# Patient Record
Sex: FEMALE | URBAN - METROPOLITAN AREA
[De-identification: names, ages, dates, MRNs, and addresses within clinical notes are randomized per-mention and may not be internally consistent; named-entity substitution may affect disease eponyms.]

---

## 2019-04-08 ENCOUNTER — IMPORTED ENCOUNTER (OUTPATIENT)
Dept: URBAN - METROPOLITAN AREA CLINIC 1 | Facility: CLINIC | Age: 59
End: 2019-04-08

## 2019-04-08 PROBLEM — H02.834: Noted: 2019-04-08

## 2019-04-08 PROBLEM — H04.123: Noted: 2019-04-08

## 2019-04-08 PROBLEM — H10.45: Noted: 2019-04-08

## 2019-04-08 PROBLEM — H25.813: Noted: 2019-04-08

## 2019-04-08 PROBLEM — H02.831: Noted: 2019-04-08

## 2019-04-08 PROCEDURE — 92004 COMPRE OPH EXAM NEW PT 1/>: CPT

## 2019-04-08 PROCEDURE — 92015 DETERMINE REFRACTIVE STATE: CPT

## 2019-04-08 NOTE — PATIENT DISCUSSION
1.  Dry Eyes OU -- Recommend increase the frequent use of OTC AT's BID-QID OU Routinely. Consider Restasis / Chrissie Emilyk w/o improvement. 2.  Allergic Conjunctivitis OU -- The condition was discussed with the patient. Avoidance of allergens and cool compresses were recommended. Continue Olopatadine BID OU (ERx'd). 3. Dermatochalasis OU UL's -- Follow with no intervention at this time. 4. Cataracts OU -- Observe for now without intervention. The patient was advised to contact us if any change or worsening of vision. Finalized Glasses MRx was given to patient today. Return for an appointment in 1 MO for a 10 / Tear Lab OU (Dry Eye / K Check OU) with Dr. Elsa White

## 2019-07-11 ENCOUNTER — IMPORTED ENCOUNTER (OUTPATIENT)
Dept: URBAN - METROPOLITAN AREA CLINIC 1 | Facility: CLINIC | Age: 59
End: 2019-07-11

## 2019-07-11 PROBLEM — H04.123: Noted: 2019-07-11

## 2019-07-11 PROBLEM — H16.143: Noted: 2019-07-11

## 2019-07-11 PROBLEM — H10.45: Noted: 2019-07-11

## 2019-07-11 PROCEDURE — 83861 MICROFLUID ANALY TEARS: CPT

## 2019-07-11 PROCEDURE — 92012 INTRM OPH EXAM EST PATIENT: CPT

## 2019-07-11 NOTE — PATIENT DISCUSSION
1.  FIONA w/ PEK OU -- Tear Lab Results: OD- 334 / OS- 312 (7/11/19). Recommend increase the frequent use of OTC AT's TID-QID OU Routinely. Begin Xiidra BID OU (ERx'd). 2. Allergic Conjunctivitis OU -- The condition was discussed with the patient. Avoidance of allergens and cool compresses were recommended. Recommend the use of OTC Zaditor BID OU PRN Itching. Continue Olopatadine BID OU. 3. Dermatochalasis OU UL's -- Follow with no intervention at this time. 4. Cataracts OU -- Observe for now without intervention. The patient was advised to contact us if any change or worsening of vision. Return for an appointment in 2 MO for a 10 OU (Dry Eye / K Check OU) with Dr. Karen Ennis.

## 2019-09-12 ENCOUNTER — IMPORTED ENCOUNTER (OUTPATIENT)
Dept: URBAN - METROPOLITAN AREA CLINIC 1 | Facility: CLINIC | Age: 59
End: 2019-09-12

## 2019-09-12 PROBLEM — H16.143: Noted: 2019-09-12

## 2019-09-12 PROBLEM — H04.123: Noted: 2019-09-12

## 2019-09-12 PROBLEM — H10.45: Noted: 2019-09-12

## 2019-09-12 PROCEDURE — 92012 INTRM OPH EXAM EST PATIENT: CPT

## 2019-09-12 NOTE — PATIENT DISCUSSION
1.  FIONA w/ PEK OU -- Tear Lab Results: OD- 334 / OS- 312 (7/11/19). Recommend increase the frequent use of OTC AT's QID OU Routinely. D/c Xiidra OU secondary to burning. Begin Restasis BID OU (ERx'd). 2. Allergic Conjunctivitis OU -- The condition was discussed with the patient. Avoidance of allergens and cool compresses were recommended. Recommend the use of OTC Zaditor BID OU PRN Itching. Continue Olopatadine BID OU. 3. Dermatochalasis OU UL's -- Follow with no intervention at this time. 4. Cataracts OU -- Observe for now without intervention. The patient was advised to contact us if any change or worsening of vision. Return for an appointment in 2 MO for a 10 OU (Dry Eye / K Check OU) with Dr. Yudith Fay.

## 2019-11-07 ENCOUNTER — IMPORTED ENCOUNTER (OUTPATIENT)
Dept: URBAN - METROPOLITAN AREA CLINIC 1 | Facility: CLINIC | Age: 59
End: 2019-11-07

## 2019-11-07 PROBLEM — H04.123: Noted: 2019-11-07

## 2019-11-07 PROBLEM — H25.813: Noted: 2019-11-07

## 2019-11-07 PROBLEM — H02.834: Noted: 2019-11-07

## 2019-11-07 PROBLEM — H10.45: Noted: 2019-11-07

## 2019-11-07 PROBLEM — H16.143: Noted: 2019-11-07

## 2019-11-07 PROBLEM — H02.831: Noted: 2019-11-07

## 2019-11-07 PROCEDURE — 92012 INTRM OPH EXAM EST PATIENT: CPT

## 2019-11-07 NOTE — PATIENT DISCUSSION
1.  FIONA w/ PEK OU -- Improved on Restasis. Continue Restasis BID OU (written Rx given to patient per patients request). Recommend increase the frequent use of OTC AT's QID OU Routinely. Urged compliance. H/o Failed trial Xiidra OU secondary to burning/bad taste. Tear Lab Results: OD- 334 / OS- 312 (7/11/19). 2. Allergic Conjunctivitis OU -- Condition discussed with patient. Advised patient to use OTC Zaditor one drop OU BID prn.3. Cataract OU -- Observe for now without intervention. The patient was advised to contact us if any change or worsening of vision4. Dermatochalasis OU UL's  -- Patient feels vision is being obstructed due to lids drooping. Will plan for taped vs untaped VF at next complete exam in April. Return for an appointment in April for a 30/VF *Taped vs. Untaped with Dr. Leigh Barrios.

## 2022-04-02 ASSESSMENT — VISUAL ACUITY
OS_SC: 20/20
OS_SC: 20/20
OD_SC: 20/25
OD_CC: J2
OD_SC: 20/20
OS_CC: J1
OS_CC: J2
OD_SC: 20/20
OS_SC: 20/25
OD_SC: 20/20
OD_CC: J1
OS_SC: 20/20

## 2022-04-02 ASSESSMENT — TONOMETRY
OS_IOP_MMHG: 16
OD_IOP_MMHG: 15
OS_IOP_MMHG: 12
OD_IOP_MMHG: 13
OD_IOP_MMHG: 16
OS_IOP_MMHG: 16
OD_IOP_MMHG: 16
OS_IOP_MMHG: 14